# Patient Record
Sex: MALE | URBAN - METROPOLITAN AREA
[De-identification: names, ages, dates, MRNs, and addresses within clinical notes are randomized per-mention and may not be internally consistent; named-entity substitution may affect disease eponyms.]

---

## 2024-01-19 ENCOUNTER — LAB OUTSIDE AN ENCOUNTER (OUTPATIENT)
Dept: URBAN - METROPOLITAN AREA CLINIC 124 | Facility: CLINIC | Age: 24
End: 2024-01-19

## 2024-01-19 ENCOUNTER — OFFICE VISIT (OUTPATIENT)
Dept: URBAN - METROPOLITAN AREA CLINIC 124 | Facility: CLINIC | Age: 24
End: 2024-01-19
Payer: COMMERCIAL

## 2024-01-19 ENCOUNTER — DASHBOARD ENCOUNTERS (OUTPATIENT)
Age: 24
End: 2024-01-19

## 2024-01-19 VITALS
HEART RATE: 83 BPM | HEIGHT: 69 IN | SYSTOLIC BLOOD PRESSURE: 129 MMHG | DIASTOLIC BLOOD PRESSURE: 76 MMHG | WEIGHT: 175 LBS | BODY MASS INDEX: 25.92 KG/M2 | TEMPERATURE: 96.8 F

## 2024-01-19 DIAGNOSIS — R13.19 ESOPHAGEAL DYSPHAGIA: ICD-10-CM

## 2024-01-19 PROCEDURE — 99204 OFFICE O/P NEW MOD 45 MIN: CPT | Performed by: INTERNAL MEDICINE

## 2024-01-19 NOTE — HPI-TODAY'S VISIT:
January 2024 visit: Patient was referred for dysphagia evaluation by PCP Ash Servin. chronic asthma, uses prn inhaler. Dysphagia since age 10. mostly to solids. no prior EGD. also has episodes of food impaction , self managed. eats food very slowly. stable weight. no heartburn. no nausea, vomiting.

## 2024-01-22 ENCOUNTER — TELEPHONE ENCOUNTER (OUTPATIENT)
Dept: URBAN - METROPOLITAN AREA CLINIC 92 | Facility: CLINIC | Age: 24
End: 2024-01-22

## 2024-02-21 ENCOUNTER — LAB (OUTPATIENT)
Dept: URBAN - METROPOLITAN AREA CLINIC 4 | Facility: CLINIC | Age: 24
End: 2024-02-21
Payer: COMMERCIAL

## 2024-02-21 ENCOUNTER — EGD (OUTPATIENT)
Dept: URBAN - METROPOLITAN AREA SURGERY CENTER 16 | Facility: SURGERY CENTER | Age: 24
End: 2024-02-21
Payer: COMMERCIAL

## 2024-02-21 DIAGNOSIS — K31.89 OTHER DISEASES OF STOMACH AND DUODENUM: ICD-10-CM

## 2024-02-21 DIAGNOSIS — T47.8X5A ADVERSE EFFECT OF OTHER AGENTS PRIMARILY AFFECTING GASTROINTESTINAL SYSTEM, INITIAL ENCOUNTER: ICD-10-CM

## 2024-02-21 DIAGNOSIS — R13.19 CERVICAL DYSPHAGIA: ICD-10-CM

## 2024-02-21 DIAGNOSIS — K20.0 ALLERGIC EOSINOPHILIC ESOPHAGITIS: ICD-10-CM

## 2024-02-21 PROCEDURE — 88312 SPECIAL STAINS GROUP 1: CPT | Performed by: PATHOLOGY

## 2024-02-21 PROCEDURE — 43239 EGD BIOPSY SINGLE/MULTIPLE: CPT | Performed by: INTERNAL MEDICINE

## 2024-02-21 PROCEDURE — 88305 TISSUE EXAM BY PATHOLOGIST: CPT | Performed by: PATHOLOGY

## 2024-02-28 ENCOUNTER — LAB (OUTPATIENT)
Dept: URBAN - METROPOLITAN AREA CLINIC 25 | Facility: CLINIC | Age: 24
End: 2024-02-28

## 2024-04-03 ENCOUNTER — TELEP (OUTPATIENT)
Dept: URBAN - METROPOLITAN AREA TELEHEALTH 2 | Facility: TELEHEALTH | Age: 24
End: 2024-04-03
Payer: COMMERCIAL

## 2024-04-03 DIAGNOSIS — R13.19 ESOPHAGEAL DYSPHAGIA: ICD-10-CM

## 2024-04-03 DIAGNOSIS — K20.0 EOSINOPHILIC ESOPHAGITIS: ICD-10-CM

## 2024-04-03 PROCEDURE — 99213 OFFICE O/P EST LOW 20 MIN: CPT

## 2024-04-03 RX ORDER — OMEPRAZOLE 20 MG/1
1 CAPSULE CAPSULE, DELAYED RELEASE ORAL TWICE A DAY
Qty: 180 CAPSULE | Refills: 1 | Status: ACTIVE | COMMUNITY
Start: 2024-02-21

## 2024-04-04 NOTE — HPI-TODAY'S VISIT:
April 10, 2024      Louann Sample  [address]  [facility] [address]    Dear Ms. Hooper,    Your procedure for your right middle and ring fingers is scheduled with Dr. Corey Fernandes on June 27, 2024 at:    [facility]  [address], [place] [address]  [phone]    Please register at [facility] on June 27, 2024.    Check-in Time:  Due to OR instrumentation/equipment needs, we are unable to confirm times in advance.You will be contacted on the afternoon of June 25, 2024 with your arrival time. If we are unable to reach you,  a detailed message will be left for you (please make sure that your voice messaging system is able to accept messages).     There are no restrictions for your surgery, and if you drive you may do so afterwards.  Please keep in mind that your right middle and ring figners will be in a dressing.  It is up to you if you would like a family member or friend accompany you to the procedure.     You have been placed on a wait list for an earlier surgery date.  If there are any cancellations, we will call you to move up the date of your surgery.    If you have any changes to your insurance, please call me with the new insurance information.  Changes can result in the delay of obtaining insurance authorization.    The following appointment(s) have been scheduled for you:    Post-op appointment with GUERDA Monaco at the [facility], Medical Office [address], [address], [address] [facility] [address] on July 11, 2024 at 1:00 pm .    If you have any work related and/or disability forms that need to be completed you will need to drop off these forms at any of our [facility] locations. Please be advised that it can take up to 15 business days to complete these requests. Please note that Dr. Fernandes will not address any forms brought in the day of surgery as this delays other  04/24 Televisit  The patient notes when he first started taking PPI medication he noticed that he saw an improvement in his dysphagia, The patient notes also when he takes albuterol he notes a slight improvement in his swallowing. Symptoms occur infrequently and episodic, the patient had one episode of food getting lodged in his esophagus last month. Notes he has to eat very slowly. Omeprazole had initially helped with dysphagia, but as of late has not been working as well. Denies odynophagia, unintentional weigth loss, BRBPR, melena, N/V.    EGD 2024  Esophageal mucosal changes suspicious for eosinophilic esophagitis.Biopsies were taken with a cold forceps for evaluation of eosinophilic esophagitis.Normal stomach. Biopsied.Normal second portion of the duodenum. Biopsied.Normal duodenal bulb surgical procedures. You can also contact the our Forms Completion Department at [phone].         For questions regarding the procedure, medications, rehab, etc., please contact the nursing staff at [phone] ([facility]).   If you have any scheduling questions or need to reschedule, please contact me at the telephone number and extension listed below.     Thank you,    Regina at [phone]  Surgery Scheduler for [facility]    \"Help us grow our quality of service. We want to improve - and you can help us. You may receive a survey in the mail. This is your opportunity to tell us what we did well, and where we could use some improvement. We value your input.”                                                                                Insurance Authorization Need to Know’s    Prior to your surgical procedure, our team will contact your Insurance Company to initiate a PreAuthorization request.      This is not a guarantee of payment from your insurance company, but rather a step taken to ensure that we have all of the information and documentation for them to confirm the procedure is one that is eligible for coverage under your plan.    We will contact you if we either need more information from you to fulfill the requirements of your insurance company, or if we need to discuss any concerns that may lead to postponement or cancellation of your procedure. If you have any questions regarding your surgery authorization, please check with your insurance company or call our office for an update.    If you need information regarding your level of benefits or out-of-pocket expenses, please contact your insurance company directly.  They can also confirm for you whether or not we (the surgeon and the hospital/surgery center) are in your plan’s preferred network (aka ‘in-network’).    What to do if… My Insurance Changes:  If, at any time, your insurance company, plan or even card changes… Please  call our office so that our team can be sure to update your records.  We will need to make sure to submit any PreAuth or pato to the correct, up-to-date insurance plan.      What to do if… My Insurance Requires A Referral:  If your insurance company requires a Referral for Specialty Care or to see a Specialist, you will need to confirm with them if you have one on file.    If your insurance carrier does not have a referral, then you will need to contact your Primary Care Physician to have one directly submitted to your insurance company ASAP.    Without a referral on file, your insurance company will not Pre-Authorize your surgery and may not cover any of your care with our specialty.    What to do if… I have a Workers Compensation (W/C) Claim:  If you have a W/C claim, please be sure to provide our reception team with the information you have regarding your claim ASAP.  We will contact your W/C carrier/adjustor to inform them of your upcoming surgery and check the status of your claim (open vs closed).  We will let you know if they advise of any concerns or issues with your claim.  Even if you have an open W/C claim, please also provide us with your personal/family insurance.  We will want to be sure this plan is loaded into your account.  We always PreAuthorize with personal insurance as a back-up to W/C.  Otherwise, if W/C doesn’t cover something along the way, you will receive a bill for the services.    What to do if… I have Month-to-Month Coverage/Premiums:  If you have an insurance plan that is paid for month to month, or is subject to plan change on a monthly basis, please be aware we cannot initiate PreAuthorization until just before the month of your surgery, as your insurance company will need to verify your premium payments/eligibility first.    What to do if… I Do Not Have Insurance Coverage or Have other Insurance/Billing Questions:  Please call our Patient Contact Center:  [phone] to speak  with a team member about your billing needs, including possible coverage options, setting up payment plans, our fee schedule, etc.

## 2024-05-29 ENCOUNTER — OUT OF OFFICE VISIT (OUTPATIENT)
Dept: URBAN - METROPOLITAN AREA SURGERY CENTER 16 | Facility: SURGERY CENTER | Age: 24
End: 2024-05-29

## 2024-05-29 RX ORDER — OMEPRAZOLE 20 MG/1
1 CAPSULE CAPSULE, DELAYED RELEASE ORAL TWICE A DAY
Qty: 180 CAPSULE | Refills: 1 | Status: ACTIVE | COMMUNITY
Start: 2024-02-21

## 2024-05-31 ENCOUNTER — TELEPHONE ENCOUNTER (OUTPATIENT)
Dept: URBAN - METROPOLITAN AREA CLINIC 6 | Facility: CLINIC | Age: 24
End: 2024-05-31

## 2024-05-31 RX ORDER — OMEPRAZOLE 20 MG/1
1 CAPSULE CAPSULE, DELAYED RELEASE ORAL TWICE A DAY
Qty: 180 CAPSULE | Refills: 1
Start: 2024-02-21

## 2024-06-04 ENCOUNTER — TELEPHONE ENCOUNTER (OUTPATIENT)
Dept: URBAN - METROPOLITAN AREA CLINIC 25 | Facility: CLINIC | Age: 24
End: 2024-06-04

## 2024-06-19 ENCOUNTER — OFFICE VISIT (OUTPATIENT)
Dept: URBAN - METROPOLITAN AREA CLINIC 25 | Facility: CLINIC | Age: 24
End: 2024-06-19
Payer: COMMERCIAL

## 2024-06-19 VITALS
DIASTOLIC BLOOD PRESSURE: 74 MMHG | TEMPERATURE: 97.1 F | WEIGHT: 180 LBS | HEART RATE: 65 BPM | SYSTOLIC BLOOD PRESSURE: 124 MMHG | BODY MASS INDEX: 26.66 KG/M2 | HEIGHT: 69 IN

## 2024-06-19 DIAGNOSIS — R12 HEARTBURN: ICD-10-CM

## 2024-06-19 DIAGNOSIS — R13.19 CERVICAL DYSPHAGIA: ICD-10-CM

## 2024-06-19 PROCEDURE — 99213 OFFICE O/P EST LOW 20 MIN: CPT

## 2024-06-19 RX ORDER — OMEPRAZOLE 20 MG/1
1 CAPSULE CAPSULE, DELAYED RELEASE ORAL TWICE A DAY
Qty: 180 CAPSULE | Refills: 1 | Status: ACTIVE | COMMUNITY
Start: 2024-02-21

## 2024-06-19 NOTE — HPI-OTHER HISTORIES
04/24 Televisit  The patient notes when he first started taking PPI medication he noticed that he saw an improvement in his dysphagia, The patient notes also when he takes albuterol he notes a slight improvement in his swallowing. Symptoms occur infrequently and episodic, the patient had one episode of food getting lodged in his esophagus last month. Notes he has to eat very slowly. Omeprazole had initially helped with dysphagia, but as of late has not been working as well. Denies odynophagia, unintentional weigth loss, BRBPR, melena, N/V.    EGD 2024  Esophageal mucosal changes suspicious for eosinophilic esophagitis.Biopsies were taken with a cold forceps for evaluation of eosinophilic esophagitis.Normal stomach. Biopsied.Normal second portion of the duodenum. Biopsied.Normal duodenal bulb  January 2024 visit: Patient was referred for dysphagia evaluation by PCP Ash Servin. chronic asthma, uses prn inhaler. Dysphagia since age 10. mostly to solids. no prior EGD. also has episodes of food impaction , self managed. eats food very slowly. stable weight. no heartburn. no nausea, vomiting.

## 2024-06-19 NOTE — HPI-TODAY'S VISIT:
06/24 OV S/p EGD revelaed normal esophageal mucosa w/o any inflammation, patient has continued to take Omeprazole 40mg QD w/ no issues, no dysphagia, or odynophagia, No breakaway reflux as well. The patient is taking omeprazole 20mg BID still. Pateint deneis N/V, BRBPR, melena, unintentional weigth loss.    EGD 2024  Normal esophagus. - Squamous mucosa w/ reflux type changes, no EoE or dysplasia  Biopsies were taken with a cold forceps for evaluation of eosinophilic esophagitis. Z-line regular, 40 cm from the incisors. Normal stomach. Normal examined duodenum.

## 2024-09-19 ENCOUNTER — OFFICE VISIT (OUTPATIENT)
Dept: URBAN - METROPOLITAN AREA CLINIC 25 | Facility: CLINIC | Age: 24
End: 2024-09-19

## 2024-11-15 ENCOUNTER — OFFICE VISIT (OUTPATIENT)
Dept: URBAN - METROPOLITAN AREA CLINIC 25 | Facility: CLINIC | Age: 24
End: 2024-11-15

## 2024-12-11 ENCOUNTER — OFFICE VISIT (OUTPATIENT)
Dept: URBAN - METROPOLITAN AREA CLINIC 25 | Facility: CLINIC | Age: 24
End: 2024-12-11
Payer: COMMERCIAL

## 2024-12-11 VITALS
WEIGHT: 190.2 LBS | SYSTOLIC BLOOD PRESSURE: 98 MMHG | BODY MASS INDEX: 28.17 KG/M2 | HEIGHT: 69 IN | HEART RATE: 89 BPM | DIASTOLIC BLOOD PRESSURE: 60 MMHG | TEMPERATURE: 98.8 F

## 2024-12-11 DIAGNOSIS — R10.13 DYSPEPSIA: ICD-10-CM

## 2024-12-11 DIAGNOSIS — K20.80 ABSCESS OF ESOPHAGUS: ICD-10-CM

## 2024-12-11 PROCEDURE — 99213 OFFICE O/P EST LOW 20 MIN: CPT

## 2024-12-11 RX ORDER — FAMOTIDINE 40 MG/1
1 TABLET AT BEDTIME TABLET, FILM COATED ORAL ONCE A DAY
Qty: 90 TABLET | Refills: 3 | OUTPATIENT
Start: 2024-12-11

## 2024-12-11 RX ORDER — OMEPRAZOLE 20 MG/1
1 CAPSULE CAPSULE, DELAYED RELEASE ORAL TWICE A DAY
Qty: 180 CAPSULE | Refills: 1 | Status: ACTIVE | COMMUNITY
Start: 2024-02-21

## 2024-12-11 RX ORDER — OMEPRAZOLE 20 MG/1
1 CAPSULE 30 MINUTES BEFORE MORNING MEAL CAPSULE, DELAYED RELEASE ORAL ONCE A DAY
Qty: 90 | Refills: 1 | OUTPATIENT
Start: 2024-12-11

## 2024-12-11 NOTE — HPI-TODAY'S VISIT:
12/24 OV  Dyspjhagia has resolved, patient reports he has currently been taking PPI 40mg twice a week, notes breakaways about 3 times a week and is diet-based, usually worse w/ triggers such as etoh/liquor and fried foods and coffee. No bowel changes, denies BRBPR, melena, unintentional weigth loss, N/V, no dysphagia or odynophagia.

## 2024-12-11 NOTE — HPI-OTHER HISTORIES
06/24 OV S/p EGD revelaed normal esophageal mucosa w/o any inflammation, patient has continued to take Omeprazole 40mg QD w/ no issues, no dysphagia, or odynophagia, No breakaway reflux as well. The patient is taking omeprazole 20mg BID still. Pateint deneis N/V, BRBPR, melena, unintentional weigth loss.    EGD 2024  Normal esophagus. - Squamous mucosa w/ reflux type changes, no EoE or dysplasia  Biopsies were taken with a cold forceps for evaluation of eosinophilic esophagitis. Z-line regular, 40 cm from the incisors. Normal stomach. Normal examined duodenum.   04/24 Televisit  The patient notes when he first started taking PPI medication he noticed that he saw an improvement in his dysphagia, The patient notes also when he takes albuterol he notes a slight improvement in his swallowing. Symptoms occur infrequently and episodic, the patient had one episode of food getting lodged in his esophagus last month. Notes he has to eat very slowly. Omeprazole had initially helped with dysphagia, but as of late has not been working as well. Denies odynophagia, unintentional weigth loss, BRBPR, melena, N/V.    EGD 2024  Esophageal mucosal changes suspicious for eosinophilic esophagitis.Biopsies were taken with a cold forceps for evaluation of eosinophilic esophagitis.Normal stomach. Biopsied.Normal second portion of the duodenum. Biopsied.Normal duodenal bulb  January 2024 visit: Patient was referred for dysphagia evaluation by PCP Ash Servin. chronic asthma, uses prn inhaler. Dysphagia since age 10. mostly to solids. no prior EGD. also has episodes of food impaction , self managed. eats food very slowly. stable weight. no heartburn. no nausea, vomiting.

## 2025-03-12 ENCOUNTER — OFFICE VISIT (OUTPATIENT)
Dept: URBAN - METROPOLITAN AREA CLINIC 25 | Facility: CLINIC | Age: 25
End: 2025-03-12

## 2025-03-13 ENCOUNTER — OFFICE VISIT (OUTPATIENT)
Dept: URBAN - METROPOLITAN AREA CLINIC 25 | Facility: CLINIC | Age: 25
End: 2025-03-13
Payer: COMMERCIAL

## 2025-03-13 VITALS
HEIGHT: 69 IN | WEIGHT: 180 LBS | HEART RATE: 79 BPM | SYSTOLIC BLOOD PRESSURE: 108 MMHG | BODY MASS INDEX: 26.66 KG/M2 | DIASTOLIC BLOOD PRESSURE: 69 MMHG

## 2025-03-13 DIAGNOSIS — R10.13 DYSPEPSIA: ICD-10-CM

## 2025-03-13 DIAGNOSIS — K20.0 EOSINOPHILIC ESOPHAGITIS: ICD-10-CM

## 2025-03-13 PROCEDURE — 99213 OFFICE O/P EST LOW 20 MIN: CPT

## 2025-03-13 RX ORDER — OMEPRAZOLE 20 MG/1
1 CAPSULE 30 MINUTES BEFORE MORNING MEAL CAPSULE, DELAYED RELEASE ORAL ONCE A DAY
Qty: 90 | Refills: 1 | Status: ACTIVE | COMMUNITY
Start: 2024-12-11

## 2025-03-13 RX ORDER — FAMOTIDINE 40 MG/1
1 TABLET AT BEDTIME TABLET, FILM COATED ORAL ONCE A DAY
Qty: 90 TABLET | Refills: 3 | Status: ACTIVE | COMMUNITY
Start: 2024-12-11

## 2025-03-13 RX ORDER — OMEPRAZOLE 20 MG/1
1 CAPSULE CAPSULE, DELAYED RELEASE ORAL TWICE A DAY
Qty: 180 CAPSULE | Refills: 1 | Status: ACTIVE | COMMUNITY
Start: 2024-02-21

## 2025-03-13 NOTE — HPI-TODAY'S VISIT:
03/25 OV Patient reports he has been doing well, notes he has not been having dysphagia or reflux issues, much better w/ elimination of coffee or red sauces. Patient reports resurgence of acid reflux symptoms during allergy season, patient has been taking PPI intermittently still, as of late with increased allergens he has been taking it twice a week again. Patient sent [employer] message requesting refill on his Adderall 20mg #45. Last related visit: 9/22/22 with Rosalinda Cooper CNP with plan for recheck on this issue in 6 months.      Per Norma Cooper's visit note:   Taking Adderall for ADHD. Takes 20 mg daily during the week with 10 mg in afternoon as needed. Only takes on weekend as needed. 45 tabs of 20 mg Adderall generally lasts him about 2 months.     Controlled Substance Agreement: 2/4/22  Urine drug screen: 2/4/22     Fills per [place] :     Routed to Rosalinda Cooper CNP for review. Melody Sotomayor

## 2025-03-13 NOTE — HPI-OTHER HISTORIES
12/24 OV  Dysphagia has resolved, patient reports he has currently been taking PPI 40mg twice a week, notes breakaways about 3 times a week and is diet-based, usually worse w/ triggers such as etoh/liquor and fried foods and coffee. No bowel changes, denies BRBPR, melena, unintentional weigth loss, N/V, no dysphagia or odynophagia.  06/24 OV S/p EGD revelaed normal esophageal mucosa w/o any inflammation, patient has continued to take Omeprazole 40mg QD w/ no issues, no dysphagia, or odynophagia, No breakaway reflux as well. The patient is taking omeprazole 20mg BID still. Pateint deneis N/V, BRBPR, melena, unintentional weigth loss.    EGD 2024  Normal esophagus. - Squamous mucosa w/ reflux type changes, no EoE or dysplasia  Biopsies were taken with a cold forceps for evaluation of eosinophilic esophagitis. Z-line regular, 40 cm from the incisors. Normal stomach. Normal examined duodenum.   04/24 Televisit  The patient notes when he first started taking PPI medication he noticed that he saw an improvement in his dysphagia, The patient notes also when he takes albuterol he notes a slight improvement in his swallowing. Symptoms occur infrequently and episodic, the patient had one episode of food getting lodged in his esophagus last month. Notes he has to eat very slowly. Omeprazole had initially helped with dysphagia, but as of late has not been working as well. Denies odynophagia, unintentional weigth loss, BRBPR, melena, N/V.    EGD 2024  Esophageal mucosal changes suspicious for eosinophilic esophagitis.Biopsies were taken with a cold forceps for evaluation of eosinophilic esophagitis.Normal stomach. Biopsied.Normal second portion of the duodenum. Biopsied.Normal duodenal bulb  January 2024 visit: Patient was referred for dysphagia evaluation by PCP Ash Servin. chronic asthma, uses prn inhaler. Dysphagia since age 10. mostly to solids. no prior EGD. also has episodes of food impaction , self managed. eats food very slowly. stable weight. no heartburn. no nausea, vomiting.

## 2025-07-16 ENCOUNTER — OFFICE VISIT (OUTPATIENT)
Dept: URBAN - METROPOLITAN AREA CLINIC 25 | Facility: CLINIC | Age: 25
End: 2025-07-16

## 2025-08-06 ENCOUNTER — OFFICE VISIT (OUTPATIENT)
Dept: URBAN - METROPOLITAN AREA CLINIC 25 | Facility: CLINIC | Age: 25
End: 2025-08-06

## 2025-08-06 DIAGNOSIS — R13.19 ESOPHAGEAL DYSPHAGIA: ICD-10-CM

## 2025-08-06 DIAGNOSIS — R12 HEARTBURN: ICD-10-CM

## 2025-08-06 DIAGNOSIS — K20.0 EOSINOPHILIC ESOPHAGITIS: ICD-10-CM

## 2025-08-06 PROCEDURE — 99214 OFFICE O/P EST MOD 30 MIN: CPT

## 2025-08-06 RX ORDER — OMEPRAZOLE 20 MG/1
1 CAPSULE CAPSULE, DELAYED RELEASE ORAL TWICE A DAY
Qty: 180 CAPSULE | Refills: 1 | Status: DISCONTINUED | COMMUNITY
Start: 2024-02-21

## 2025-08-06 RX ORDER — OMEPRAZOLE 20 MG/1
1 CAPSULE 30 MINUTES BEFORE MORNING MEAL CAPSULE, DELAYED RELEASE ORAL ONCE A DAY
Qty: 90 | Refills: 1 | Status: ACTIVE | COMMUNITY
Start: 2024-12-11

## 2025-08-06 RX ORDER — FAMOTIDINE 40 MG/1
1 TABLET AT BEDTIME TABLET, FILM COATED ORAL ONCE A DAY
Qty: 90 TABLET | Refills: 3 | Status: DISCONTINUED | COMMUNITY
Start: 2024-12-11

## 2025-08-06 RX ORDER — OMEPRAZOLE 20 MG/1
1 CAPSULE 30 MINUTES BEFORE MORNING MEAL CAPSULE, DELAYED RELEASE ORAL ONCE A DAY
Qty: 90 | Refills: 1 | OUTPATIENT
Start: 2025-08-06